# Patient Record
Sex: FEMALE | Race: WHITE | Employment: OTHER | URBAN - METROPOLITAN AREA
[De-identification: names, ages, dates, MRNs, and addresses within clinical notes are randomized per-mention and may not be internally consistent; named-entity substitution may affect disease eponyms.]

---

## 2020-10-28 ENCOUNTER — THERAPY VISIT (OUTPATIENT)
Dept: PHYSICAL THERAPY | Facility: CLINIC | Age: 70
End: 2020-10-28
Payer: COMMERCIAL

## 2020-10-28 DIAGNOSIS — M25.511 RIGHT SHOULDER PAIN: ICD-10-CM

## 2020-10-28 PROCEDURE — 97110 THERAPEUTIC EXERCISES: CPT | Mod: GP | Performed by: PHYSICAL THERAPIST

## 2020-10-28 PROCEDURE — 97161 PT EVAL LOW COMPLEX 20 MIN: CPT | Mod: GP | Performed by: PHYSICAL THERAPIST

## 2020-10-28 NOTE — LETTER
MARLENI DENISEINE [facility] [place] [facility]  [address]  [place] [address]  [phone]    2020    Re: Doretha Agarwal   :   1950  MRN:  [id number]   REFERRING PHYSICIAN:   Andrew ACKERMAN [facility]    Date of Initial Evaluation:  10/28/20  Visits:  Rxs Used: 1  Reason for Referral:  Right shoulder pain    [place] [facility] Initial Evaluation    Subjective:    Patient Health History  Doretha Agarwal being seen for R shoulder replacement.   Problem began: 10/9/2020.   Problem occurred: age   Pain is reported as 3/10 on pain scale.  General health as reported by patient is good.  Pertinent medical history includes: cancer, concussions/dizziness, menopausal, numbness/tingling and osteoarthritis (breast CA).   Red flags:  None as reported by patient.   Other medical allergies details: morphine.   Surgeries include:  Cancer surgery and orthopedic surgery. Other surgery history details: mastectomies, B FROILAN and 1 revision, thumbs, elbow.    Current medications:  Anti-depressants.    Current occupation is retired.   Primary job tasks include:  Computer work.   Pt had a TSA on 10/9 and will be travelling to [place] to stay with her mother for 3 months in a couple weeks. She is limited with all functional tasks and would like to also be able to ride her bicycle again. Currently she is out of her sling and feeling good.    Objective:    SHOULDER:    Cervical Screen: normal and painfree    Shoulder:   PROM L PROM R AROM L AROM R MMT L MMT R   Flex  92 173  5/5 nt   Abd  64 180  5/5 nt   Full Can         Empty Can         IR     5/5 nt   ER  20 90  5/5 nt   Ext/IR           Scapulothoraic Rhythm: limited R AROM, increased upper trap activation    Palpation: TTP R lateral shoulder    Special tests: deferred due to known surgery    GH Mobility  L  R   Posterior glide wnl nt   Inferior glide wnl nt   Anterior glide wnl nt     Assessment/Plan:    Patient is a 70 year old female with right side shoulder  complaints.    Patient has the following significant findings with corresponding treatment plan.                Diagnosis 1:  S/p R TSA  Pain -  hot/cold therapy, manual therapy, education and home program  Decreased ROM/flexibility - manual therapy, therapeutic exercise, therapeutic activity and home program  Decreased strength - therapeutic exercise, therapeutic activities and home program    Cumulative Therapy Evaluation is: Low complexity.  Previous and current functional limitations:  (See Goal Flow Sheet for this information)    Short term and Long term goals: (See Goal Flow Sheet for this information)   Communication ability:  Patient appears to be able to clearly communicate and understand verbal and written communication and follow directions correctly.  Treatment Explanation - The following has been discussed with the patient:   RX ordered/plan of care  Anticipated outcomes  Possible risks and side effects  This patient would benefit from PT intervention to resume normal activities.   Rehab potential is good.    Frequency:  1 X week, once daily  Duration:  for 12 weeks  Discharge Plan:  Achieve all LTG.  Independent in home treatment program.  Reach maximal therapeutic benefit.    Thank you for your referral.    INQUIRIES  Therapist: NORBERTO Garcia [facility]  [address] [place] [address]  Phone: [phone]  Fax: [phone]

## 2020-10-28 NOTE — PROGRESS NOTES
[facility] Initial Evaluation  Subjective:    Patient Health History  Doretha Agarwal being seen for R shoulder replacement.     Problem began: 10/9/2020.   Problem occurred: age   Pain is reported as 3/10 on pain scale.  General health as reported by patient is good.  Pertinent medical history includes: cancer, concussions/dizziness, menopausal, numbness/tingling and osteoarthritis (breast CA).   Red flags:  None as reported by patient.   Other medical allergies details: morphine.   Surgeries include:  Cancer surgery and orthopedic surgery. Other surgery history details: mastectomies, B FROILAN and 1 revision, thumbs, elbow.    Current medications:  Anti-depressants.    Current occupation is retired.   Primary job tasks include:  Computer work.                                Pt had a TSA on 10/9 and will be travelling to [place] to stay with her mother for 3 months in a couple weeks. She is limited with all functional tasks and would like to also be able to ride her bicycle again. Currently she is out of her sling and feeling good.    Objective:        SHOULDER:    Cervical Screen: normal and painfree    Shoulder:   PROM L PROM R AROM L AROM R MMT L MMT R   Flex  92 173  5/5 nt   Abd  64 180  5/5 nt   Full Can         Empty Can         IR     5/5 nt   ER  20 90  5/5 nt   Ext/IR           Scapulothoraic Rhythm: limited R AROM, increased upper trap activation    Palpation: TTP R lateral shoulder    Special tests: deferred due to known surgery    GH Mobility  L  R   Posterior glide wnl nt   Inferior glide wnl nt   Anterior glide wnl nt                 System    Physical Exam    General     ROS    Assessment/Plan:    Patient is a 70 year old female with right side shoulder complaints.    Patient has the following significant findings with corresponding treatment plan.                Diagnosis 1:  S/p R TSA  Pain -  hot/cold therapy, manual therapy, education and home program  Decreased  ROM/flexibility - manual therapy, therapeutic exercise, therapeutic activity and home program  Decreased strength - therapeutic exercise, therapeutic activities and home program    Cumulative Therapy Evaluation is: Low complexity.    Previous and current functional limitations:  (See Goal Flow Sheet for this information)    Short term and Long term goals: (See Goal Flow Sheet for this information)     Communication ability:  Patient appears to be able to clearly communicate and understand verbal and written communication and follow directions correctly.  Treatment Explanation - The following has been discussed with the patient:   RX ordered/plan of care  Anticipated outcomes  Possible risks and side effects  This patient would benefit from PT intervention to resume normal activities.   Rehab potential is good.    Frequency:  1 X week, once daily  Duration:  for 12 weeks  Discharge Plan:  Achieve all LTG.  Independent in home treatment program.  Reach maximal therapeutic benefit.    Please refer to the daily flowsheet for treatment today, total treatment time and time spent performing 1:1 timed codes.

## 2020-11-02 ENCOUNTER — THERAPY VISIT (OUTPATIENT)
Dept: PHYSICAL THERAPY | Facility: CLINIC | Age: 70
End: 2020-11-02
Payer: COMMERCIAL

## 2020-11-02 DIAGNOSIS — M25.511 RIGHT SHOULDER PAIN: ICD-10-CM

## 2020-11-02 PROCEDURE — 97110 THERAPEUTIC EXERCISES: CPT | Mod: GP | Performed by: PHYSICAL THERAPIST

## 2020-11-05 ENCOUNTER — THERAPY VISIT (OUTPATIENT)
Dept: PHYSICAL THERAPY | Facility: CLINIC | Age: 70
End: 2020-11-05
Payer: COMMERCIAL

## 2020-11-05 DIAGNOSIS — M25.511 RIGHT SHOULDER PAIN: ICD-10-CM

## 2020-11-05 PROCEDURE — 97110 THERAPEUTIC EXERCISES: CPT | Mod: GP | Performed by: PHYSICAL THERAPIST

## 2020-11-05 NOTE — LETTER
MARLENI ACKERMAN [facility]  [address] [place] [address] [phone]    2020    Re: Doretha Agarwal   :   1950  MRN:  [id number]   REFERRING PHYSICIAN:   Andrew ACKERMAN [facility]    Date of Initial Evaluation:  10/28/20  Visits:  Rxs Used: 3  Reason for Referral:  Right shoulder pain    PROGRESS  REPORT  Progress reporting period is from 10/28/20 to 20.     SUBJECTIVE  Subjective: pt reports feeling good, saw MD who was pleased and instructed to continue PT. reports she will be travelling east for a while and this is the last visit until she returns without known date.   Current Pain level: 3/10   Initial Pain level: 3/10   Changes in function: Yes, see goal flow sheet for change in function   Adverse reactions: None  The objective findings are from DOS 20.    OBJECTIVE  Objective: supine AROM R shoulder flx: 103degs abd:90degs ER: 40degs      ASSESSMENT/PLAN  Updated problem list and treatment plan: Diagnosis 1:  S/p R TSA  STG/LTGs have been met or progress has been made towards goals:  Yes, progressing ROM  Assessment of Progress: The patient's condition is improving.  The patient's condition has potential to improve.  Self Management Plans:  Patient is independent in a home treatment program.  I have re-evaluated this patient and find that the nature, scope, duration and intensity of the therapy is appropriate for the medical condition of the patient.  Doretha continues to require the following intervention to meet STG and LTG's: PT    Recommendations:  This patient would benefit from continued therapy.     Frequency:  1 X week, once daily  Duration:  for 10 weeks    Thank you for your referral.    INQUIRIES  Therapist: NORBERTO Garcia [facility]  [address] [place] [address]  Phone: [phone]  Fax: [phone]

## 2020-11-05 NOTE — PROGRESS NOTES
Subjective:  HPI  Physical Exam                    Objective:  System    Physical Exam    General     ROS    Assessment/Plan:    PROGRESS  REPORT    Progress reporting period is from 10/28/20 to 11/5/20.     SUBJECTIVE  Subjective: pt reports feeling good, saw MD who was pleased and instructed to continue PT. reports she will be travelling east for a while and this is the last visit until she returns without known date.   Current Pain level: 3/10   Initial Pain level: 3/10   Changes in function: Yes, see goal flow sheet for change in function   Adverse reactions: None;   ,     The objective findings are from DOS 11/5/20.    OBJECTIVE  Objective: supine AROM R shoulder flx: 103degs abd:90degs ER: 40degs      ASSESSMENT/PLAN  Updated problem list and treatment plan: Diagnosis 1:  S/p R TSA  STG/LTGs have been met or progress has been made towards goals:  Yes, progressing ROM  Assessment of Progress: The patient's condition is improving.  The patient's condition has potential to improve.  Self Management Plans:  Patient is independent in a home treatment program.  I have re-evaluated this patient and find that the nature, scope, duration and intensity of the therapy is appropriate for the medical condition of the patient.  Doretha continues to require the following intervention to meet STG and LTG's: PT    Recommendations:  This patient would benefit from continued therapy.     Frequency:  1 X week, once daily  Duration:  for 10 weeks        Please refer to the daily flowsheet for treatment today, total treatment time and time spent performing 1:1 timed codes.

## 2020-12-31 ENCOUNTER — THERAPY VISIT (OUTPATIENT)
Dept: PHYSICAL THERAPY | Facility: CLINIC | Age: 70
End: 2020-12-31
Payer: COMMERCIAL

## 2020-12-31 DIAGNOSIS — M25.511 RIGHT SHOULDER PAIN: ICD-10-CM

## 2020-12-31 PROCEDURE — 97110 THERAPEUTIC EXERCISES: CPT | Mod: GP | Performed by: PHYSICAL THERAPIST

## 2020-12-31 NOTE — PROGRESS NOTES
Subjective:  HPI  Physical Exam                    Objective:  System    Physical Exam    General     ROS    Assessment/Plan:    PROGRESS  REPORT    Progress reporting period is from 10/28/20 to 12/31/20.     SUBJECTIVE  Subjective: pt reports she has been having difficulty with overhead reaching with R shoulder, hasn't been doing all the exercises, was travelling out east and is leaving again next week for another multiple week trip.   Current Pain level: 3/10   Initial Pain level: 3/10   Changes in function: Yes, see goal flow sheet for change in function   Adverse reactions: None;   ,     The objective findings are from DOS 12/31/20.    OBJECTIVE  Objective: significant R upper trap compensation with overhead reaching.      ASSESSMENT/PLAN  Updated problem list and treatment plan: Diagnosis 1:  S/p R TSA  STG/LTGs have been met or progress has been made towards goals:  Yes, progressing towards reaching.  Assessment of Progress: The patient's condition is improving.  The patient's condition has potential to improve.  Self Management Plans:  Patient has been instructed in a home treatment program.  I have re-evaluated this patient and find that the nature, scope, duration and intensity of the therapy is appropriate for the medical condition of the patient.  Doretha continues to require the following intervention to meet STG and LTG's: PT    Recommendations:  This patient would benefit from continued therapy.     Frequency:  1 X week, once daily  Duration:  for 8 weeks        Please refer to the daily flowsheet for treatment today, total treatment time and time spent performing 1:1 timed codes.

## 2020-12-31 NOTE — LETTER
MARLENI ACKERMAN [facility]  [address] [place] [address] [phone]    2021    Re: Doretha Agarwal   :   1950  MRN:  [id number]   REFERRING PHYSICIAN:   Andrew ACKERMAN [facility]    Date of Initial Evaluation:  20  Visits:  Rxs Used: 4  Reason for Referral:  Right shoulder pain    PROGRESS  REPORT  Progress reporting period is from 10/28/20 to 20.     SUBJECTIVE  Subjective: pt reports she has been having difficulty with overhead reaching with R shoulder, hasn't been doing all the exercises, was travelling out east and is leaving again next week for another multiple week trip.   Current Pain level: 310   Initial Pain level: 310   Changes in function: Yes, see goal flow sheet for change in function   Adverse reactions: None;  The objective findings are from DOS 20.    OBJECTIVE  Objective: significant R upper trap compensation with overhead reaching.      ASSESSMENT/PLAN  Updated problem list and treatment plan: Diagnosis 1:  S/p R TSA  STG/LTGs have been met or progress has been made towards goals:  Yes, progressing towards reaching.  Assessment of Progress: The patient's condition is improving.  The patient's condition has potential to improve.  Self Management Plans:  Patient has been instructed in a home treatment program.  I have re-evaluated this patient and find that the nature, scope, duration and intensity of the therapy is appropriate for the medical condition of the patient.  Doretha continues to require the following intervention to meet STG and LTG's: PT    Recommendations:  This patient would benefit from continued therapy.     Frequency:  1 X week, once daily  Duration:  for 8 weeks    Thank you for your referral.    INQUIRIES  Therapist: NORBERTO Garcia [facility]  [address] [place] [address]  Phone: [phone]  Fax: [phone]

## 2021-01-26 ENCOUNTER — THERAPY VISIT (OUTPATIENT)
Dept: PHYSICAL THERAPY | Facility: CLINIC | Age: 71
End: 2021-01-26
Payer: COMMERCIAL

## 2021-01-26 DIAGNOSIS — M25.511 RIGHT SHOULDER PAIN: ICD-10-CM

## 2021-01-26 PROCEDURE — 97110 THERAPEUTIC EXERCISES: CPT | Mod: GP | Performed by: PHYSICAL THERAPIST

## 2021-01-28 ENCOUNTER — THERAPY VISIT (OUTPATIENT)
Dept: PHYSICAL THERAPY | Facility: CLINIC | Age: 71
End: 2021-01-28
Payer: COMMERCIAL

## 2021-01-28 DIAGNOSIS — M25.511 RIGHT SHOULDER PAIN: ICD-10-CM

## 2021-01-28 PROCEDURE — 97110 THERAPEUTIC EXERCISES: CPT | Mod: GP | Performed by: PHYSICAL THERAPIST

## 2021-02-01 ENCOUNTER — THERAPY VISIT (OUTPATIENT)
Dept: PHYSICAL THERAPY | Facility: CLINIC | Age: 71
End: 2021-02-01
Payer: COMMERCIAL

## 2021-02-01 DIAGNOSIS — M25.511 RIGHT SHOULDER PAIN: ICD-10-CM

## 2021-02-01 PROCEDURE — 97530 THERAPEUTIC ACTIVITIES: CPT | Mod: GP | Performed by: PHYSICAL THERAPIST

## 2021-02-08 ENCOUNTER — THERAPY VISIT (OUTPATIENT)
Dept: PHYSICAL THERAPY | Facility: CLINIC | Age: 71
End: 2021-02-08
Payer: COMMERCIAL

## 2021-02-08 DIAGNOSIS — M25.511 RIGHT SHOULDER PAIN: ICD-10-CM

## 2021-02-08 PROCEDURE — 97110 THERAPEUTIC EXERCISES: CPT | Mod: GP | Performed by: PHYSICAL THERAPIST

## 2021-02-11 ENCOUNTER — THERAPY VISIT (OUTPATIENT)
Dept: PHYSICAL THERAPY | Facility: CLINIC | Age: 71
End: 2021-02-11
Payer: COMMERCIAL

## 2021-02-11 DIAGNOSIS — M25.511 RIGHT SHOULDER PAIN: ICD-10-CM

## 2021-02-11 PROCEDURE — 97110 THERAPEUTIC EXERCISES: CPT | Mod: GP | Performed by: PHYSICAL THERAPIST

## 2021-02-15 ENCOUNTER — THERAPY VISIT (OUTPATIENT)
Dept: PHYSICAL THERAPY | Facility: CLINIC | Age: 71
End: 2021-02-15
Payer: COMMERCIAL

## 2021-02-15 DIAGNOSIS — M25.511 RIGHT SHOULDER PAIN: ICD-10-CM

## 2021-02-15 PROCEDURE — 97110 THERAPEUTIC EXERCISES: CPT | Mod: GP | Performed by: PHYSICAL THERAPIST

## 2021-02-18 ENCOUNTER — THERAPY VISIT (OUTPATIENT)
Dept: PHYSICAL THERAPY | Facility: CLINIC | Age: 71
End: 2021-02-18
Payer: COMMERCIAL

## 2021-02-18 DIAGNOSIS — M25.511 RIGHT SHOULDER PAIN: ICD-10-CM

## 2021-02-18 PROCEDURE — 97110 THERAPEUTIC EXERCISES: CPT | Mod: GP | Performed by: PHYSICAL THERAPIST

## 2021-02-18 PROCEDURE — 97140 MANUAL THERAPY 1/> REGIONS: CPT | Mod: GP | Performed by: PHYSICAL THERAPIST

## 2021-02-22 ENCOUNTER — THERAPY VISIT (OUTPATIENT)
Dept: PHYSICAL THERAPY | Facility: CLINIC | Age: 71
End: 2021-02-22
Payer: COMMERCIAL

## 2021-02-22 DIAGNOSIS — M25.511 RIGHT SHOULDER PAIN: ICD-10-CM

## 2021-02-22 PROCEDURE — 97110 THERAPEUTIC EXERCISES: CPT | Mod: GP | Performed by: PHYSICAL THERAPIST

## 2021-02-22 PROCEDURE — 97140 MANUAL THERAPY 1/> REGIONS: CPT | Mod: GP | Performed by: PHYSICAL THERAPIST

## 2021-02-22 NOTE — PROGRESS NOTES
Subjective:  HPI  Physical Exam                    Objective:  System    Physical Exam    General     ROS    Assessment/Plan:    PROGRESS  REPORT    Progress reporting period is from 12/31/20 to 2/22/21.     SUBJECTIVE  Subjective: pt reports she is doing great and has no complaints. the R arm is getting better   Current Pain level: 0/10   Initial Pain level: 3/10   The objective findings are from DOS 2/22/21.    OBJECTIVE  Objective: supine AROM R shoulder flx:140degs abd:168degs ER:71degs IR:T11       Shoulder Pain and Disability Index (SPADI): 61.54    ASSESSMENT/PLAN  Updated problem list and treatment plan: Diagnosis 1:  S/p total shoulder arthroplasty  STG/LTGs have been met or progress has been made towards goals: Yes, slowly progressing towards full reaching  Assessment of Progress: The patient's condition has potential to improve.  Self Management Plans:  Patient is independent in a home treatment program.  I have re-evaluated this patient and find that the nature, scope, duration and intensity of the therapy is appropriate for the medical condition of the patient.  Doretha continues to require the following intervention to meet STG and LTG's: PT    Recommendations:  Pt has made some good improvements in pain and ROM and is slowly progressing strength. She is still limited significantly with against gravity strength and function and would benefit from continued PT services 1x/wk once daily [id number], or 8 more visits.    Please refer to the daily flowsheet for treatment today, total treatment time and time spent performing 1:1 timed codes.

## 2021-02-22 NOTE — LETTER
MARLENI ACKERMAN [facility]  [address] [place] [id number] [phone]    2021    Re: Doretha Agarwal   :   1950  MRN:  [id number]   REFERRING PHYSICIAN:   Andrew ACKERMAN [facility]    Date of Initial Evaluation:  10/28/20  Visits:  Rxs Used: 12  Reason for Referral:  Right shoulder pain    PROGRESS  REPORT    Progress reporting period is from 20 to 21.     SUBJECTIVE  Subjective: pt reports she is doing great and has no complaints. the R arm is getting better   Current Pain level: 0/10   Initial Pain level: 3/10   The objective findings are from DOS 21.    OBJECTIVE  Objective: supine AROM R shoulder flx:140 degs   Abd:168 degs   ER:71 degs   IR:T11       Shoulder Pain and Disability Index (SPADI): 61.54    ASSESSMENT/PLAN  Updated problem list and treatment plan: Diagnosis 1:  S/p total shoulder arthroplasty  STG/LTGs have been met or progress has been made towards goals: Yes, slowly progressing towards full reaching  Assessment of Progress: The patient's condition has potential to improve.  Self Management Plans:  Patient is independent in a home treatment program.  I have re-evaluated this patient and find that the nature, scope, duration and intensity of the therapy is appropriate for the medical condition of the patient.  Doretha continues to require the following intervention to meet STG and LTG's: PT      Recommendations:  Pt has made some good improvements in pain and ROM and is slowly progressing strength. She is still limited significantly with against gravity strength and function and would benefit from continued PT services 1x/wk once daily [id number], or 8 more visits.      Thank you for your referral.            INQUIRIES  Therapist: NORBERTO Garcia [facility]  [address]  [place] [address]  Phone: [phone]  Fax: [phone]

## 2021-02-25 ENCOUNTER — THERAPY VISIT (OUTPATIENT)
Dept: PHYSICAL THERAPY | Facility: CLINIC | Age: 71
End: 2021-02-25
Payer: COMMERCIAL

## 2021-02-25 DIAGNOSIS — M25.511 RIGHT SHOULDER PAIN: ICD-10-CM

## 2021-02-25 PROCEDURE — 97110 THERAPEUTIC EXERCISES: CPT | Mod: GP | Performed by: PHYSICAL THERAPIST

## 2021-02-25 PROCEDURE — 97140 MANUAL THERAPY 1/> REGIONS: CPT | Mod: GP | Performed by: PHYSICAL THERAPIST

## 2021-03-02 ENCOUNTER — THERAPY VISIT (OUTPATIENT)
Dept: PHYSICAL THERAPY | Facility: CLINIC | Age: 71
End: 2021-03-02
Payer: COMMERCIAL

## 2021-03-02 DIAGNOSIS — M25.511 RIGHT SHOULDER PAIN: ICD-10-CM

## 2021-03-02 PROCEDURE — 97140 MANUAL THERAPY 1/> REGIONS: CPT | Mod: GP | Performed by: PHYSICAL THERAPIST

## 2021-03-02 PROCEDURE — 97110 THERAPEUTIC EXERCISES: CPT | Mod: GP | Performed by: PHYSICAL THERAPIST

## 2021-03-08 ENCOUNTER — THERAPY VISIT (OUTPATIENT)
Dept: PHYSICAL THERAPY | Facility: CLINIC | Age: 71
End: 2021-03-08
Payer: COMMERCIAL

## 2021-03-08 DIAGNOSIS — M25.511 RIGHT SHOULDER PAIN: ICD-10-CM

## 2021-03-08 PROCEDURE — 97110 THERAPEUTIC EXERCISES: CPT | Mod: GP | Performed by: PHYSICAL THERAPIST

## 2021-03-08 PROCEDURE — 97140 MANUAL THERAPY 1/> REGIONS: CPT | Mod: GP | Performed by: PHYSICAL THERAPIST

## 2021-03-11 ENCOUNTER — THERAPY VISIT (OUTPATIENT)
Dept: PHYSICAL THERAPY | Facility: CLINIC | Age: 71
End: 2021-03-11
Payer: COMMERCIAL

## 2021-03-11 DIAGNOSIS — M25.511 RIGHT SHOULDER PAIN: ICD-10-CM

## 2021-03-11 PROCEDURE — 97110 THERAPEUTIC EXERCISES: CPT | Mod: GP | Performed by: PHYSICAL THERAPIST

## 2021-03-11 PROCEDURE — 97140 MANUAL THERAPY 1/> REGIONS: CPT | Mod: GP | Performed by: PHYSICAL THERAPIST

## 2021-03-11 NOTE — LETTER
[facility]  [address]  [place] [address]  [phone]    2021    Re: Doretha Agarwal   :   1950  MRN:  [id number]   REFERRING PHYSICIAN:   Andrew NO [facility]    Date of Initial Evaluation:  21  Visits:  Rxs Used: 16  Reason for Referral:  Right shoulder pain    PROGRESS  REPORT  Progress reporting period is from 21 to 3/11/21.     SUBJECTIVE  Subjective: pt reports the arm is doing well, feels fatigue and some soreness but overall feels she has made much improvement.   Current Pain level: 0/10   Initial Pain level: 3/10   Changes in function: Yes, see goal flow sheet for change in function   Adverse reactions: None    The objective findings are from DOS 3/11/21.    OBJECTIVE  Objective: supine R shoulder AROM flx: 150degs abd: 160degs ER: 65degs      ASSESSMENT/PLAN  Updated problem list and treatment plan: Diagnosis 1:  S/p R TSA  STG/LTGs have been met or progress has been made towards goals:  Yes, progressing reaching  Assessment of Progress: The patient's condition is improving.  The patient's condition has potential to improve.  Self Management Plans:  Patient has been instructed in a home treatment program.  I have re-evaluated this patient and find that the nature, scope, duration and intensity of the therapy is appropriate for the medical condition of the patient.  Doretha continues to require the following intervention to meet STG and LTG's: PT  The patient is returning to your office for a recheck appointment.    Recommendations:  Doretha has been making slow but steady progress with supine R shoulder AROM but still has significant upper trap compensation and limitation with against gravity AROM. Currently we have 4 remaining visits [place], we will continue to work on strength and ROM as tolerated.    Thank you for your  referral.    INQUIRIES  Therapist: Les Cisneros DPT  [facility] [place] [facility]  [address]  [place] [address]  Phone: [phone]  Fax: [phone]

## 2021-03-11 NOTE — PROGRESS NOTES
Subjective:  HPI  Physical Exam                    Objective:  System    Physical Exam    General     ROS    Assessment/Plan:    PROGRESS  REPORT    Progress reporting period is from 2/22/21 to 3/11/21.     SUBJECTIVE  Subjective: pt reports the arm is doing well, feels fatigue and some soreness but overall feels she has made much improvement.   Current Pain level: 0/10   Initial Pain level: 3/10   Changes in function: Yes, see goal flow sheet for change in function   Adverse reactions: None;   ,     The objective findings are from DOS 3/11/21.    OBJECTIVE  Objective: supine R shoulder AROM flx: 150degs abd: 160degs ER: 65degs      ASSESSMENT/PLAN  Updated problem list and treatment plan: Diagnosis 1:  S/p R TSA  STG/LTGs have been met or progress has been made towards goals:  Yes, progressing reaching  Assessment of Progress: The patient's condition is improving.  The patient's condition has potential to improve.  Self Management Plans:  Patient has been instructed in a home treatment program.  I have re-evaluated this patient and find that the nature, scope, duration and intensity of the therapy is appropriate for the medical condition of the patient.  Doretha continues to require the following intervention to meet STG and LTG's: PT  The patient is returning to your office for a recheck appointment.    Recommendations:  Doretha has been making slow but steady progress with supine R shoulder AROM but still has significant upper trap compensation and limitation with against gravity AROM. Currently we have 4 remaining visits [place], we will continue to work on strength and ROM as tolerated.    Please refer to the daily flowsheet for treatment today, total treatment time and time spent performing 1:1 timed codes.

## 2021-03-22 ENCOUNTER — THERAPY VISIT (OUTPATIENT)
Dept: PHYSICAL THERAPY | Facility: CLINIC | Age: 71
End: 2021-03-22
Payer: COMMERCIAL

## 2021-03-22 DIAGNOSIS — M25.511 RIGHT SHOULDER PAIN: ICD-10-CM

## 2021-03-22 PROCEDURE — 97110 THERAPEUTIC EXERCISES: CPT | Mod: GP | Performed by: PHYSICAL THERAPIST

## 2021-03-22 PROCEDURE — 97140 MANUAL THERAPY 1/> REGIONS: CPT | Mod: GP | Performed by: PHYSICAL THERAPIST
